# Patient Record
Sex: FEMALE | Race: WHITE | URBAN - METROPOLITAN AREA
[De-identification: names, ages, dates, MRNs, and addresses within clinical notes are randomized per-mention and may not be internally consistent; named-entity substitution may affect disease eponyms.]

---

## 2020-11-10 ENCOUNTER — TELEPHONE (OUTPATIENT)
Dept: ORTHOPEDIC SURGERY | Age: 70
End: 2020-11-10

## 2020-11-10 NOTE — TELEPHONE ENCOUNTER
PATIENT IS GOING TO DENTIST TO HAVE A TOOTH PULLED. KEFLEX 500 MG #20 -- 4 TABS ONE HOUR PRIOR TO DENTAL PROCEDURE.  2 REFILLS.    CALLED INTO [facility] ON [address]